# Patient Record
Sex: FEMALE | Race: WHITE
[De-identification: names, ages, dates, MRNs, and addresses within clinical notes are randomized per-mention and may not be internally consistent; named-entity substitution may affect disease eponyms.]

---

## 2020-09-30 ENCOUNTER — HOSPITAL ENCOUNTER (INPATIENT)
Dept: HOSPITAL 77 - KA.ED | Age: 70
LOS: 1 days | Discharge: HOSPICE | DRG: 683 | End: 2020-10-01
Attending: INTERNAL MEDICINE | Admitting: PHYSICIAN ASSISTANT
Payer: MEDICARE

## 2020-09-30 DIAGNOSIS — Z79.899: ICD-10-CM

## 2020-09-30 DIAGNOSIS — Z88.6: ICD-10-CM

## 2020-09-30 DIAGNOSIS — E66.9: ICD-10-CM

## 2020-09-30 DIAGNOSIS — E11.9: ICD-10-CM

## 2020-09-30 DIAGNOSIS — Z90.49: ICD-10-CM

## 2020-09-30 DIAGNOSIS — H54.7: ICD-10-CM

## 2020-09-30 DIAGNOSIS — M19.90: ICD-10-CM

## 2020-09-30 DIAGNOSIS — Z88.5: ICD-10-CM

## 2020-09-30 DIAGNOSIS — M54.9: ICD-10-CM

## 2020-09-30 DIAGNOSIS — Z90.710: ICD-10-CM

## 2020-09-30 DIAGNOSIS — G89.29: ICD-10-CM

## 2020-09-30 DIAGNOSIS — R00.1: ICD-10-CM

## 2020-09-30 DIAGNOSIS — E87.1: ICD-10-CM

## 2020-09-30 DIAGNOSIS — Z51.5: ICD-10-CM

## 2020-09-30 DIAGNOSIS — Z20.828: ICD-10-CM

## 2020-09-30 DIAGNOSIS — E87.5: ICD-10-CM

## 2020-09-30 DIAGNOSIS — M54.2: ICD-10-CM

## 2020-09-30 DIAGNOSIS — N17.9: Primary | ICD-10-CM

## 2020-09-30 DIAGNOSIS — D64.9: ICD-10-CM

## 2020-09-30 DIAGNOSIS — K75.81: ICD-10-CM

## 2020-09-30 DIAGNOSIS — Z88.1: ICD-10-CM

## 2020-09-30 DIAGNOSIS — Z98.890: ICD-10-CM

## 2020-09-30 DIAGNOSIS — Z79.4: ICD-10-CM

## 2020-09-30 DIAGNOSIS — I12.9: ICD-10-CM

## 2020-09-30 DIAGNOSIS — I10: ICD-10-CM

## 2020-09-30 DIAGNOSIS — K74.60: ICD-10-CM

## 2020-09-30 DIAGNOSIS — R53.1: ICD-10-CM

## 2020-09-30 DIAGNOSIS — E11.22: ICD-10-CM

## 2020-09-30 DIAGNOSIS — N28.9: ICD-10-CM

## 2020-09-30 DIAGNOSIS — Z98.49: ICD-10-CM

## 2020-09-30 DIAGNOSIS — K21.9: ICD-10-CM

## 2020-09-30 DIAGNOSIS — Z96.659: ICD-10-CM

## 2020-09-30 DIAGNOSIS — K72.90: ICD-10-CM

## 2020-09-30 DIAGNOSIS — Z66: ICD-10-CM

## 2020-09-30 DIAGNOSIS — N18.9: ICD-10-CM

## 2020-09-30 DIAGNOSIS — R79.89: ICD-10-CM

## 2020-09-30 DIAGNOSIS — Z88.8: ICD-10-CM

## 2020-09-30 LAB
ANION GAP SERPL CALC-SCNC: 22.5 MMOL/L (ref 5–15)
ANION GAP SERPL CALC-SCNC: 23.3 MMOL/L (ref 5–15)
APTT PPP: 30.9 SEC (ref 22.8–31.4)
CHLORIDE SERPL-SCNC: 100 MMOL/L (ref 98–115)
CHLORIDE SERPL-SCNC: 97 MMOL/L (ref 98–115)
SODIUM SERPL-SCNC: 126 MMOL/L (ref 136–145)
SODIUM SERPL-SCNC: 131 MMOL/L (ref 136–145)

## 2020-09-30 PROCEDURE — U0002 COVID-19 LAB TEST NON-CDC: HCPCS

## 2020-09-30 NOTE — CT
______________________________________________________________________________   

  

[id number] CT/CT Head WO IV  

EXAM: CT Head WO IV  

   

 CLINICAL DATA: WEAKNESS  

   

 COMPARISON STUDY: None  

   

 FINDINGS:  

   

 No intracranial hemorrhage, extra-axial fluid collection, mass, or acute  

 ischemia.   

   

 Generalized parenchymal atrophy with scattered areas of nonspecific white matter  

 disease, commonly seen as sequela of chronic microvascular ischemia.  

   

 Soft tissues are unremarkable. Mild mucosal thickening in the paranasal sinuses.  

 The mastoid air cells are well aerated and clear.  

    

 IMPRESSION:  

   

 No acute intracranial findings.  

   

 Electronically signed by Khalif Santos MD on 9/30/2020 9:01 AM  

   

  

Khalif Santos DO                 

 09/30/20 0903    

  

Thank you for allowing us to participate in the care of your patient.

## 2020-09-30 NOTE — EDM.PDOC
ED HPI GENERAL MEDICAL PROBLEM





- General


Chief Complaint: General


Stated Complaint: generalized weakness


Time Seen by Provider: 09/30/20 01:47


Source of Information: Reports: Patient


History Limitations: Reports: No Limitations





- History of Present Illness


INITIAL COMMENTS - FREE TEXT/NARRATIVE: 





68 YO WF WITH PMH OF CIRRHOSIS PRESENTS TO ER FROM HOME WITH 3 DAY HISTORY OF 

GENERALIZED WEAKNESS. PT REPORTS SHE HAS END STAGE LIVER DISEASE AND IS IN THE 

PROCESS OF GOING ON HOSPICE CARE. PT REPORTS OVER THE LAST 3 DAYS SHE HAS FELT 

MORE UNSTABLE AND HAS REQUIRED MORE ASSISTANCE WITH HER ACTIVITIES OF DAILY 

LIVING AND STATES "MY  CAN'T HELP ME ANYMORE DUE TO HIS WEAK HEART." PT 

DENIES SLURRED SPEECH, NO FACIAL DROOP, NO BLURRED VISION. PT REPORTS MILD 

HEADACHE AND NAUSEA WITHOUT VOMITING. PT ABLE TO STAND BUT FEELS TO WEAK TO 

WALK. PT DENIES FEVER/CHILLS, NO CHEST PAIN OR SHORTNESS OF BREATH. PT IS ALERT 

AND ORIENTED X 4. PT MOVING ALL EXTREMITIES WITHOUT DIFFICULTY AND DENIES ANY 

LEFT/RIGHT SIDED NEGLECT. 


Onset Date: 09/27/20


Duration: Day(s): (3)


Location: Reports: Generalized


Quality: Reports: Dull


Severity: Mild


Improves with: Reports: Rest


Worsens with: Reports: Movement


Associated Symptoms: Reports: No Other Symptoms, Headaches, Malaise, 

Nausea/Vomiting, Weakness.  Denies: Confusion, Chest Pain, Diaphoresis, 

Fever/Chills, Seizure, Shortness of Breath, Syncope


  ** Headache


Pain Score (Numeric/FACES): 4





- Related Data


                                    Allergies











Allergy/AdvReac Type Severity Reaction Status Date / Time


 


cephalexin monohydrate Allergy  Rash Verified 09/30/20 02:34





[From Keflex]     


 


meperidine HCl [From Demerol] Allergy  Stomach Verified 09/30/20 02:34





   Upset  


 


metformin Allergy  Headache Verified 09/30/20 02:34


 


morphine Allergy  Cannot Verified 09/30/20 02:34





   Remember  


 


niacin Allergy  Tachycardia Verified 09/30/20 02:34


 


oxycodone Allergy  Hallucinati Verified 09/30/20 02:34





   ons  











Home Meds: 


                                    Home Meds





Furosemide 40 mg PO BID 03/11/15 [History]


Pantoprazole [ProTONIX***] 40 mg PO BEDTIME 03/11/15 [History]


Insulin Aspart [NovoLOG] 4 units SUBCUT ASDIRECTED PRN 12/18/16 [History]


traMADol [Ultram] 25 mg PO QAM 12/18/16 [History]


traMADol [Ultram] 50 mg PO BEDTIME 12/18/16 [History]


Insulin Degludec [Tresiba Flextouch U-200] 8 units SUBCUT BEDTIME 09/27/18 

[History]


Rifaximin [Xifaxan] 550 mg PO BID 05/09/19 [History]


Clobetasol [Clobetasol 0.05%] 1 applic TOP BID PRN 09/30/20 [History]


Sulfamethoxazole/Trimethoprim [Sulfamethoxazole-Tmp Ds Tablet] 1 tab PO BID 

09/30/20 [History]


amLODIPine [Norvasc] 2.5 mg PO 1900 09/30/20 [History]


amLODIPine [Norvasc] 5 mg PO QAM 09/30/20 [History]











Past Medical History


HEENT History: Reports: Cataract, Impaired Vision


Cardiovascular History: Reports: Hypertension


Respiratory History: Reports: None


Gastrointestinal History: Reports: Cirrhosis, GERD


Other Gastrointestinal History: esophageal varices


Genitourinary History: Reports: Renal Disease, Other (See Below)


OB/GYN History: Reports: Pregnancy


Musculoskeletal History: Reports: Arthritis, Back Pain, Chronic, Fracture, Neck 

Pain, Chronic


Neurological History: Reports: Other (See Below)


Other Neuro History: neck surgery


Psychiatric History: Reports: None


Endocrine/Metabolic History: Reports: Diabetes, Type II, IDDM, Obesity/BMI 30+


Hematologic History: Reports: Anemia, Blood Transfusion(s)


Immunologic History: Reports: None


Oncologic (Cancer) History: Reports: None


Dermatologic History: Reports: None


Other Dermatologic History: diabetic sores on legs (small )





- Infectious Disease History


Infectious Disease History: Reports: Mumps





- Past Surgical History


HEENT Surgical History: Reports: Cataract Surgery


Cardiovascular Surgical History: Reports: None


GI Surgical History: Reports: Cholecystectomy, Colonoscopy, Other (See Below)


Female  Surgical History: Reports: Hysterectomy


Neurological Surgical History: Reports: C-Spine


Musculoskeletal Surgical History: Reports: Knee Replacement, Other (See Below)





Social & Family History





- Family History


Family Medical History: Noncontributory


Cardiac: Reports: Heart Failure


Neurological: Reports: CVA





- Caffeine Use


Caffeine Use: Reports: Coffee


Caffeine Use Comment: daily in am





ED ROS GENERAL





- Review of Systems


Review Of Systems: See Below


Constitutional: Reports: Malaise, Weakness


HEENT: Reports: No Symptoms


Respiratory: Reports: No Symptoms


Cardiovascular: Reports: No Symptoms


Endocrine: Reports: No Symptoms


GI/Abdominal: Reports: Nausea


: Reports: No Symptoms


Musculoskeletal: Reports: No Symptoms


Skin: Reports: No Symptoms


Neurological: Reports: Headache.  Denies: Confusion, Dizziness, Numbness, 

Paresthesia, Pre-Existing Deficit, Trouble Speaking


Psychiatric: Reports: No Symptoms


Hematologic/Lymphatic: Reports: No Symptoms


Immunologic: Reports: No Symptoms





ED EXAM, GENERAL





- Physical Exam


Exam: See Below


Exam Limited By: No Limitations


General Appearance: Alert, WD/WN, No Apparent Distress


Eye Exam: Bilateral Eye: EOMI, PERRL


Throat/Mouth: Normal Inspection, Normal Lips, Normal Teeth, Normal Gums, Normal 

Oropharynx, Normal Voice, No Airway Compromise


Head: Atraumatic, Normocephalic


Neck: Normal Inspection, Supple, Non-Tender, Full Range of Motion


Respiratory/Chest: No Respiratory Distress, Lungs Clear, Normal Breath Sounds, 

No Accessory Muscle Use, Chest Non-Tender


Cardiovascular: No Edema, No Gallop, No JVD, No Rub, Bradycardia, Irregularly 

Irregular


GI/Abdominal: Normal Bowel Sounds, Soft, Non-Tender, No Organomegaly, No 

Abnormal Bruit, No Mass


Back Exam: Normal Inspection, Full Range of Motion, NT


Extremities: Normal Inspection, Normal Range of Motion, Non-Tender, Normal 

Capillary Refill, No Pedal Edema


Neurological: Alert, Oriented, CN II-XII Intact, Normal Cognition, Normal Gait, 

Normal Reflexes, No Motor/Sensory Deficits


Psychiatric: Normal Affect, Normal Mood


Skin Exam: Warm, Dry, Intact, Normal Color, No Rash


Lymphatic: No Adenopathy





EKG INTERPRETATION


EKG Date: 09/30/20


Time: 02:00


Rhythm: A-Fib


Rate (Beats/Min): 33


P-Wave: Absent


QRS: RBBB


ST-T: Normal


QT: Normal


Comparison: NA - No Prior EKG





Course





- Vital Signs


Last Recorded V/S: 


                                Last Vital Signs











Temp  36.6 C   09/30/20 02:02


 


Pulse  29 L  09/30/20 02:02


 


Resp  12   09/30/20 02:02


 


BP  122/44 L  09/30/20 02:02


 


Pulse Ox  96   09/30/20 02:02














- Orders/Labs/Meds


Orders: 


                               Active Orders 24 hr











 Category Date Time Status


 


 EKG Documentation Completion [RC] ASDIRECTED Care  09/30/20 01:46 Active


 


 RT Aerosol Therapy [RC] ASDIRECTED Care  09/30/20 03:32 Active


 


 Chest 1V Frontal [CR] Stat Exams  09/30/20 01:45 Ordered


 


 Head wo Cont [CT] Stat Exams  09/30/20 01:45 Ordered


 


 Dextrose 50% in Water Med  09/30/20 03:45 Once





 50 ml IV NOW ONE   


 


 Insulin Regular, Human [HumuLIN R] Med  09/30/20 03:45 Once





 10 unit IV NOW ONE   


 


 Sodium Chloride 0.9% [Normal Saline] 1,000 ml Med  09/30/20 03:22 Active





 IV .BOLUS   


 


 EKG 12 Lead [EK] Stat Ther  09/30/20 01:45 Ordered








                                Medication Orders





Dextrose/Water (Dextrose 50% In Water)  50 ml IV NOW ONE


   Stop: 09/30/20 03:46


Sodium Chloride (Normal Saline)  1,000 mls @ 999 mls/hr IV .BOLUS ONE


   Stop: 09/30/20 04:22


Insulin Human Regular (Humulin R)  10 unit IV NOW ONE


   Stop: 09/30/20 03:46








Labs: 


                                Laboratory Tests











  09/30/20 09/30/20 09/30/20 Range/Units





  02:28 02:28 02:28 


 


WBC  8.85    (5.00-10.00)  10^3/uL


 


RBC  3.77 L    (3.80-5.50)  10^6/uL


 


Hgb  10.7 L D    (12.0-16.0)  g/dL


 


Hct  32.4 L    (37.0-47.0)  %


 


MCV  85.9  D    (82.0-92.0)  fL


 


MCH  28.4    (27.0-31.0)  pg


 


MCHC  33.0    (32.0-36.0)  g/dL


 


RDW  14.7 H    (11.5-14.5)  %


 


Plt Count  280  D    (150-400)  10^3/uL


 


MPV  9.7    (7.4-10.4)  fL


 


Immature Gran % (Auto)  0.6    (0.0-5.0)  %


 


Neut % (Auto)  83.3 H    (50.0-70.0)  %


 


Lymph % (Auto)  4.1 L    (20.0-40.0)  %


 


Mono % (Auto)  10.7 H    (2.0-8.0)  %


 


Eos % (Auto)  0.7 L    (1.0-3.0)  %


 


Baso % (Auto)  0.6    (0.0-1.0)  %


 


Neut # (Auto)  7.38 H    (2.50-7.00)  10^3/uL


 


Lymph # (Auto)  0.36 L    (1.00-4.00)  10^3/uL


 


Mono # (Auto)  0.95 H    (0.10-0.80)  10^3/uL


 


Eos # (Auto)  0.06 L    (0.10-0.30)  10^3/uL


 


Baso # (Auto)  0.05    (0.00-0.10)  10^3/uL


 


Immature Gran # (Auto)  0.05    (0.00-0.50)  10^3/uL


 


PT    11.5 H  (9.2-11.2)  SEC


 


INR    1.1  (0.9-1.1)  


 


APTT    30.9  (22.8-31.4)  SEC


 


Sodium   126 L   (136-145)  mmol/L


 


Potassium   9.1 H* D   (3.3-5.3)  mmol/L


 


Chloride   97 L   ()  mmol/L


 


Carbon Dioxide   14.8 L D   (21.0-32.0)  mmol/L


 


Anion Gap   23.3 H   (5-15)  mmol/L


 


BUN   90 H* D   (6-25)  mg/dL


 


Creatinine   4.03 H D   (0.51-1.17)  mg/dL


 


Est Cr Clr Drug Dosing   10.59   mL/min


 


Estimated GFR (MDRD)   11   mL/min


 


Glucose   118 H  (75 - 99) mg/dL


 


Calcium   8.9   (8.7-10.3)  mg/dL


 


Total Bilirubin   0.4   (0.2-1.0)  mg/dL


 


AST   40 H   (15-37)  U/L


 


ALT   20   (12-78)  U/L


 


Alkaline Phosphatase   215 H   ()  IU/L


 


Ammonia     (11-32)  umol/L


 


Creatine Kinase     ()  U/L


 


CK-MB (CK-2)     (0.00-4.30)  ng/mL


 


Troponin I     (0.00-0.070)  ng/mL


 


Total Protein   7.2   (6.4-8.2)  g/dL


 


Albumin   2.49 L   (3.00-4.80)  g/dL


 


Specimen Type     


 


Urine Color     (YELLOW)  


 


Urine Appearance     (CLEAR)  


 


Urine pH     (5.0-9.0)  


 


Ur Specific Gravity     (1.005-1.030)  


 


Urine Protein     (NEGATIVE)  mg/dL


 


Urine Glucose (UA)     (NEGATIVE)  mg/dL


 


Urine Ketones     (NEGATIVE)  mg/dL


 


Urine Occult Blood     (NEGATIVE)  


 


Urine Nitrite     (NEGATIVE)  


 


Urine Bilirubin     (NEGATIVE)  


 


Urine Urobilinogen     (0.2-1.0)  E.U./dL


 


Ur Leukocyte Esterase     (NEGATIVE)  


 


Urine RBC     (0-5)  /HPF


 


Urine WBC     (0-5)  /HPF


 


Ur Epithelial Cells     /LPF


 


Amorphous Sediment     (0/HPF)  /HPF


 


Urine Bacteria     (NONE TO FEW)  /HPF














  09/30/20 09/30/20 09/30/20 Range/Units





  02:28 02:28 02:55 


 


WBC     (5.00-10.00)  10^3/uL


 


RBC     (3.80-5.50)  10^6/uL


 


Hgb     (12.0-16.0)  g/dL


 


Hct     (37.0-47.0)  %


 


MCV     (82.0-92.0)  fL


 


MCH     (27.0-31.0)  pg


 


MCHC     (32.0-36.0)  g/dL


 


RDW     (11.5-14.5)  %


 


Plt Count     (150-400)  10^3/uL


 


MPV     (7.4-10.4)  fL


 


Immature Gran % (Auto)     (0.0-5.0)  %


 


Neut % (Auto)     (50.0-70.0)  %


 


Lymph % (Auto)     (20.0-40.0)  %


 


Mono % (Auto)     (2.0-8.0)  %


 


Eos % (Auto)     (1.0-3.0)  %


 


Baso % (Auto)     (0.0-1.0)  %


 


Neut # (Auto)     (2.50-7.00)  10^3/uL


 


Lymph # (Auto)     (1.00-4.00)  10^3/uL


 


Mono # (Auto)     (0.10-0.80)  10^3/uL


 


Eos # (Auto)     (0.10-0.30)  10^3/uL


 


Baso # (Auto)     (0.00-0.10)  10^3/uL


 


Immature Gran # (Auto)     (0.00-0.50)  10^3/uL


 


PT     (9.2-11.2)  SEC


 


INR     (0.9-1.1)  


 


APTT     (22.8-31.4)  SEC


 


Sodium     (136-145)  mmol/L


 


Potassium     (3.3-5.3)  mmol/L


 


Chloride     ()  mmol/L


 


Carbon Dioxide     (21.0-32.0)  mmol/L


 


Anion Gap     (5-15)  mmol/L


 


BUN     (6-25)  mg/dL


 


Creatinine     (0.51-1.17)  mg/dL


 


Est Cr Clr Drug Dosing     mL/min


 


Estimated GFR (MDRD)     mL/min


 


Glucose    (75 - 99) mg/dL


 


Calcium     (8.7-10.3)  mg/dL


 


Total Bilirubin     (0.2-1.0)  mg/dL


 


AST     (15-37)  U/L


 


ALT     (12-78)  U/L


 


Alkaline Phosphatase     ()  IU/L


 


Ammonia  44 H    (11-32)  umol/L


 


Creatine Kinase   62   ()  U/L


 


CK-MB (CK-2)   3.10   (0.00-4.30)  ng/mL


 


Troponin I   0.21 H*   (0.00-0.070)  ng/mL


 


Total Protein     (6.4-8.2)  g/dL


 


Albumin     (3.00-4.80)  g/dL


 


Specimen Type    Urinvoid  


 


Urine Color    Light yellow  (YELLOW)  


 


Urine Appearance    Turbid H  (CLEAR)  


 


Urine pH    5.5  (5.0-9.0)  


 


Ur Specific Gravity    >= 1.030  (1.005-1.030)  


 


Urine Protein    >=300 H  (NEGATIVE)  mg/dL


 


Urine Glucose (UA)    Negative  (NEGATIVE)  mg/dL


 


Urine Ketones    Negative  (NEGATIVE)  mg/dL


 


Urine Occult Blood    Large H  (NEGATIVE)  


 


Urine Nitrite    Negative  (NEGATIVE)  


 


Urine Bilirubin    Negative  (NEGATIVE)  


 


Urine Urobilinogen    0.2  (0.2-1.0)  E.U./dL


 


Ur Leukocyte Esterase    Negative  (NEGATIVE)  


 


Urine RBC    30-40 H  (0-5)  /HPF


 


Urine WBC    10-20 H  (0-5)  /HPF


 


Ur Epithelial Cells    Few  /LPF


 


Amorphous Sediment    Many H  (0/HPF)  /HPF


 


Urine Bacteria    Few  (NONE TO FEW)  /HPF











Meds: 


Medications











Generic Name Dose Route Start Last Admin





  Trade Name Freq  PRN Reason Stop Dose Admin


 


Dextrose/Water  50 ml  09/30/20 03:45 





  Dextrose 50% In Water  IV  09/30/20 03:46 





  NOW ONE  


 


Sodium Chloride  1,000 mls @ 999 mls/hr  09/30/20 03:22 





  Normal Saline  IV  09/30/20 04:22 





  .BOLUS ONE  


 


Insulin Human Regular  10 unit  09/30/20 03:45 





  Humulin R  IV  09/30/20 03:46 





  NOW ONE  














Discontinued Medications














Generic Name Dose Route Start Last Admin





  Trade Name Freq  PRN Reason Stop Dose Admin


 


Albuterol  2.5 mg  09/30/20 03:29 





  Proventil Neb Soln  NEB  09/30/20 03:30 





  ONETIME ONE  


 


Calcium Gluconate  1 gm  09/30/20 03:25 





  Calcium Gluconate  IVPUSH  09/30/20 03:26 





  ONETIME ONE  


 


Sodium Polystyrene Sulfonate  15 gm  09/30/20 03:27 





  Kayexalate  PO  09/30/20 03:28 





  ONETIME ONE  














- Radiology Interpretation


Free Text/Narrative:: 





CT HEAD-NAD 





CXR-NAD





- Re-Assessments/Exams


Free Text/Narrative Re-Assessment/Exam: 





09/30/20 02:22


DISCUSSED SLOW HEART RATE AND NEED FOR EMERGENT CARDIOLOGY EVALUATION AND 

POSSIBLE PACEMAKER FOR FURTHER EVALUATION AND TREATMENT. PT STATES SHE DOES NOT 

WANT ANY TREATMENT FOR HER HEART AND SLOW HEART RATE. PT IS AGREEABLE TO 

ADMISSION OVERNIGHT AND TO GO HOME ON HOSPICE CARE. PT ALERT AND ORIENTED X 4 

AND IN NAD AT THIS TIME.  IS AT BEDSIDE AND IN AGREEMENT WITH PLAN OF 

CARE. PT HAS A DNR/DNI. 


09/30/20 02:24





09/30/20 02:30








Departure





- Departure


Time of Disposition: 03:46


Disposition: Admitted As Inpatient 64


Condition: Critical


Clinical Impression: 


 Hyponatremia, Hyperkalemia, Elevated troponin I level, End stage liver disease,

 Bradycardia, DNR (do not resuscitate), Hospice care








- Discharge Information


Referrals: 


Jing Joseph MD [Primary Care Provider] - 


Forms:  ED Department Discharge





Sepsis Event Note (ED)





- Focused Exam


Vital Signs: 


                                   Vital Signs











  Temp Pulse Resp BP Pulse Ox


 


 09/30/20 02:02  36.6 C  29 L  12  122/44 L  96














- My Orders


Last 24 Hours: 


My Active Orders





09/30/20 01:45


Chest 1V Frontal [CR] Stat 


Head wo Cont [CT] Stat 


EKG 12 Lead [EK] Stat 





09/30/20 01:46


EKG Documentation Completion [RC] ASDIRECTED 





09/30/20 03:22


Sodium Chloride 0.9% [Normal Saline] 1,000 ml IV .BOLUS 





09/30/20 03:32


RT Aerosol Therapy [RC] ASDIRECTED 





09/30/20 03:45


Dextrose 50% in Water   50 ml IV NOW ONE 


Insulin Regular, Human [HumuLIN R]   10 unit IV NOW ONE 














- Assessment/Plan


Last 24 Hours: 


My Active Orders





09/30/20 01:45


Chest 1V Frontal [CR] Stat 


Head wo Cont [CT] Stat 


EKG 12 Lead [EK] Stat 





09/30/20 01:46


EKG Documentation Completion [RC] ASDIRECTED 





09/30/20 03:22


Sodium Chloride 0.9% [Normal Saline] 1,000 ml IV .BOLUS 





09/30/20 03:32


RT Aerosol Therapy [RC] ASDIRECTED 





09/30/20 03:45


Dextrose 50% in Water   50 ml IV NOW ONE 


Insulin Regular, Human [HumuLIN R]   10 unit IV NOW ONE 











Assessment:: 





1. END STAGE LIVER DISEASE


2. BRADYCARDIA


3. HYPERKALEMIA


4. ACUTE ON CHRONIC RENAL FAILURE


5. HYPONATREMIA


6. ELEVATED TROP I


7. DNR/DNI


8. HOSPICE CARE





Plan: 





1. ADMIT TO MEDICINE- DR JING ZARATE


2. NS @125CC/HR FOR HYPONATREMIA


3. CALCIUM GLUCONATE; KAYEXALATE; INSULIN/D50; ALBUTEROL FOR HYPERKALEMIA


4. REPEAT LABS IN AM


5. COMFORT CARE


6. HOSPICE CONSULT IN AM

## 2020-09-30 NOTE — CR
______________________________________________________________________________   

  

[id number] RAD/RAD Chest PA or AP 1V  

EXAM:  RAD Chest PA or AP 1V  

   

 INDICATION:  WEAKNESS  

   

 COMPARISON:  May 10, 2018.  

   

 DISCUSSION:    

   

 Cardiomediastinal silhouette is stable in size and contour.  

   

 No infiltrate, effusion, pneumothorax, or edema.  

   

 IMPRESSION:  

 No acute cardiopulmonary abnormality.  

   

 Electronically signed by Khalif Santos MD on 9/30/2020 8:40 AM  

   

  

Khalif Santos DO                 

 09/30/20 0842    

  

Thank you for allowing us to participate in the care of your patient.

## 2020-10-01 VITALS — HEART RATE: 89 BPM | SYSTOLIC BLOOD PRESSURE: 143 MMHG | DIASTOLIC BLOOD PRESSURE: 66 MMHG

## 2020-10-01 NOTE — DISCH
This is 69-year-old female who was admitted by Rashid Alvares PA-C through the

emergency room in the early morning of 09/30/2020.  This is a 69-year-old white

female with a past history of LARRY, who presented to the emergency room from

home via EMS with a 3-day history of generalized weakness.  She has end-stage

liver disease.  She has been dependent on paracentesis in the past, but has

recently had a drain placed, so this can be drained every 2-3 days.  The patient

reported to the ER provider that over the past 3 days she has felt more unstable

and required more assistance with her activities.  Her  takes care of her

at home and has multiple medical problems as well and is frankly exhausted.  The

patient denied any slurred speech, no facial droop, no blurred vision.  She did

report a mild headache and nausea without vitamin.  The patient is able to

stand, but feels weak, too weak to walk.

 

The patient's lab work in the emergency room was significant for low RBC of 3.77

and a low hemoglobin of 10.7.  Comprehensive metabolic panel showed a potassium

of 9.1, which is a critical value, normal is 3.3 to 5.3.  BUN and creatinine

were 90 and 4.03 respectively.  Estimated GFR was 11.  AST was elevated at 40.

Alkaline phosphatase was elevated at 215.  Her albumin was low at 2.49.  She did

receive IV fluids and regular insulin to reduce her potassium.  Calcium

gluconate and Kayexalate were also given.  She had lab work repeated yesterday

morning, which showed an improvement in her potassium from 9.1 to 8.2.  BUN and

creatinine remained elevated at 88 and 4.01.

 

The patient was on Bactrim when she presented to the emergency room.  Apparently

last Monday, she was started on Bactrim for an infection in her leg.  Due to her

renal function, this was discontinued shortly after admission.  She also had an

elevated troponin of 0.21.

 

The patient is a DNR/DNI and wishes to go home on hospice.  She wished to have

comfort care.  Over the course of the hospitalization, the patient's condition

has not deteriorated.  We did not draw lab work this morning in anticipation for

discharge.  We did discontinue her Bactrim as well as furosemide.  Her

amlodipine was also discontinued due to the fact that hospice would not cover

this medication.

 

PHYSICAL EXAMINATION:

GENERAL:  The patient is up in a chair.  She is alert and oriented to time,

place, person and self.  She asks appropriate questions regarding her prognosis

and what to expect.

SKIN:  Pale, warm, dry to touch.

HEART AND LUNGS:  Normal.

ABDOMEN:  She does have an intact drain, which drains the ascites from her

abdomen.

EXTREMITIES:  She has minimal pedal edema.

 

IMPRESSION:

1. Nonalcoholic steatohepatitis, end-stage liver disease, going home on

    hospice care.

2. Hyperkalemia.  This is mostly related to her renal failure.

3. Renal failure.  This will most likely become progressive.

4. Weakness.  Hospice has evaluated the home and has delivered the equipment

    that the patient will need on discharge.

 

I did discuss the patient taking Kayexalate at home to help with her potassium.

She is not interested in this.  All medications were continued as before with

the exception of furosemide and amlodipine and Bactrim.  I have updated Dr. Jing Love regarding this patient's condition on disposition.  She

will be followed by [facility] whose medical director is

China Holley.  Hopefully, we will be able to stay updated on the patient's

condition.

 

DD:  10/01/2020 14:56:31

DT:  10/01/2020 15:47:06

Job #:  [id number]